# Patient Record
Sex: FEMALE | Race: WHITE | URBAN - NONMETROPOLITAN AREA
[De-identification: names, ages, dates, MRNs, and addresses within clinical notes are randomized per-mention and may not be internally consistent; named-entity substitution may affect disease eponyms.]

---

## 2017-07-10 ENCOUNTER — HOSPITAL ENCOUNTER (EMERGENCY)
Facility: HOSPITAL | Age: 27
Discharge: HOME | End: 2017-07-11
Attending: EMERGENCY MEDICINE | Admitting: EMERGENCY MEDICINE
Payer: COMMERCIAL

## 2017-07-10 DIAGNOSIS — L55.0 SUNBURN OF FIRST DEGREE: ICD-10-CM

## 2017-07-10 PROCEDURE — 25000128 H RX IP 250 OP 636: Performed by: EMERGENCY MEDICINE

## 2017-07-10 PROCEDURE — 25000125 ZZHC RX 250: Performed by: EMERGENCY MEDICINE

## 2017-07-10 PROCEDURE — 96372 THER/PROPH/DIAG INJ SC/IM: CPT

## 2017-07-10 PROCEDURE — 25000132 ZZH RX MED GY IP 250 OP 250 PS 637: Performed by: EMERGENCY MEDICINE

## 2017-07-10 PROCEDURE — 99284 EMERGENCY DEPT VISIT MOD MDM: CPT | Mod: 25

## 2017-07-10 PROCEDURE — 99284 EMERGENCY DEPT VISIT MOD MDM: CPT | Performed by: EMERGENCY MEDICINE

## 2017-07-10 RX ORDER — LORAZEPAM 1 MG/1
1 TABLET ORAL ONCE
Status: COMPLETED | OUTPATIENT
Start: 2017-07-10 | End: 2017-07-10

## 2017-07-10 RX ORDER — ONDANSETRON 4 MG/1
4 TABLET, ORALLY DISINTEGRATING ORAL ONCE
Status: COMPLETED | OUTPATIENT
Start: 2017-07-10 | End: 2017-07-10

## 2017-07-10 RX ORDER — KETOROLAC TROMETHAMINE 15 MG/ML
15 INJECTION, SOLUTION INTRAMUSCULAR; INTRAVENOUS ONCE
Status: COMPLETED | OUTPATIENT
Start: 2017-07-10 | End: 2017-07-10

## 2017-07-10 RX ADMIN — KETOROLAC TROMETHAMINE 15 MG: 15 INJECTION, SOLUTION INTRAMUSCULAR; INTRAVENOUS at 23:55

## 2017-07-10 RX ADMIN — LORAZEPAM 1 MG: 1 TABLET ORAL at 23:55

## 2017-07-10 RX ADMIN — ONDANSETRON 4 MG: 4 TABLET, ORALLY DISINTEGRATING ORAL at 23:55

## 2017-07-10 NOTE — ED AVS SNAPSHOT
[facility]    [address]    [place] [address]    Phone:  [phone]                                       Nicky Simpson   MRN: [id number]    Department:  [facility]   Date of Visit:  7/10/2017           Patient Information     Date Of Birth          1990        Your diagnoses for this visit were:     Sunburn of first degree        You were seen by Cheikh Dugan MD.      Follow-up Information     Follow up with [facility].    Specialty:  EMERGENCY MEDICINE    Why:  As needed if signs of infection occur    Contact information:    [address]  [place] [address] [phone]    Additional information:    From [place]: Take [address]. Turn left at [address]. Turn left at the first stoplight on [address] [place] [address]. At the first stop sign, take a right onto [address]. Take a left into the parking lot and continue through until you reach the North enterance of the building.       From [place]: Take [address]. Take the [address] ramp towards [place]. Turn left onto [address]. Take a slight right onto [address]. Turn left at the first stoplight on [address]. At the first stop sign, take a right onto [address]. Take a left into the parking lot and continue through until you reach the North enterance of the building.       From [place]: Take [address]. Take a right at [address]. At the first stop sign, take a right onto [address]. Take a left into the parking lot and continue through until you reach the North enterance of the building.         Discharge Instructions         Sunburn  A sunburn is an injury to the skin. It is caused by over-exposure to ultraviolet (UV) light from the sun. The skin becomes pink or red and painful. Very severe sunburns may cause blistering and fluid draining from the skin. Open blisters may become infected. Watch for signs  of infection. These include worsening pain, redness, swelling, or pus draining from the burn.  Sunburn starts to get better after 1 to 2 days. A few days later the skin begins to peel. It may take up to 3 weeks to fully heal. This depends on how severe the burn is.  Home care  The following guidelines will help you care for your sunburn at home:    Place an ice pack over the injured area. Do this for 20 minutes every 1 to 2 hours the first day for pain relief. To make an ice pack, put ice cubes in a plastic bag that seals at the top. Wrap the bag in a clean, thin towel or cloth. Never put ice or an ice pack directly on your skin. This can cause damage. You can keep using an ice pack at least 3 to 4 times a day until the pain goes away. Cool baths and showers will also help with pain relief.    If you have blisters, don't break them. Open blisters slow the healing process. They also raise your risk of infection. You can cover the open blisters with antibacterial cream or ointment, and a dressing or bandage.    Wash the burned area daily with soap and water. Then gently dry it with a clean towel. If a dressing was used, put a clean one back on until any open blisters dry up. If the bandage sticks, soak it off in warm water. You can also use nonstick dressings to help prevent this from happening.    Drink plenty of fluids to avoid fluid loss (dehydration).  Medicine    You can take over-the-counter medicine for pain, unless you were given a different pain medicine to use. Talk with your provider before using these medicines if you have chronic liver or kidney disease, ever had a stomach ulcer or GI bleeding, or are taking blood thinner medicines. Don't give ibuprofen to children younger than 6 months.    Over-the-counter first-aid creams and sprays made with lidocaine or benzocaine can also help with pain. However, some people are sensitive to medicines that have these ingredients. If the redness or itching gets worse,  stop using these medicines. You can use aloe or a moisturizing cream with aloe, or hydrocortisone cream (sold over the counter) to help with pain and swelling. Use these only over spots that don t have broken blisters.    Antibiotics are usually not given unless there is an infection. If you were prescribed antibiotics, take them until they are finished. It is important to finish the antibiotics even if the burn looks better. This will ensure the infection has cleared.  Prevention  Sun exposure damages the DNA of skin cells. This can lead to premature skin aging and wrinkles. Sun exposure is the main cause of skin cancer. Protect your skin from the sun by following these tips:    Limit your exposure to UV light. The sun is strongest from 10 a.m. to 4 p.m. If possible, arrange your sun exposure to be before or after those hours. The sun is more intense at the beach, where light reflects off the sand and water. The sun is also more intense at higher altitudes, especially where there is reflecting snow. You can even get sunburn on a cloudy day, because UV light passes through clouds.    Do not use tanning beds.    Wear protective clothing and a hat. Clothing is more effective than sunscreen alone in blocking UV light.    Stay in the shade or carry an umbrella.    Use sunscreen on your skin. Put sunscreen on 15 to 20 minutes before going out in the sun. This gives the sunscreen time to interact with your skin. Reapply sunscreen every 1 to 2 hours. Reapply it sooner if it is washed away by sweat or water. Use a sunscreen rated at SPF 30 or higher.    Wear sunglasses to protect your eyes from UV exposure.    Many medicines can increase your sensitivity to the sun. These include heart, nausea, anti-inflammatory, and diabetic medicines. It also includes antibiotics and diuretics. Check medicine fact sheets. Talk with your provider if you have questions about your increased risk of sun sensitivity. Sunscreens may not prevent  this.   Follow-up care  Sunburn usually heals without any problems. Follow up with your provider if your sunburn is not healing with home treatments. Also see your provider if you have signs and symptoms of infection.  When to seek medical advice  Call your healthcare provider right away if any of these occur:    Pain that gets worse    Increasing redness, or red streaks leading away from an open blister    Swelling or pus coming from open blisters    Upset stomach (nausea)    Fever of 100.4  F (38.0  C) or higher, or as directed  Call 911  Call 911 if you have dizziness, fainting, or weakness.  Date Last Reviewed: 8/1/2016 2000-2017 [employer]. [address] [place] [address], [place] [address]. All rights reserved. This information is not intended as a substitute for professional medical care. Always follow your healthcare professional's instructions.    =============================================    One percent hydrocortisone cream (eg Cortate-10) can be applied up to 3 times daily to the effected areas, available without prescription  Lanacane is another topical cream that can help with the pain.  Acetaminophen up to 1000 milligrams up to 4 times daily as needed  Ibuprofen 400-600 mg up to 4 times daily as needed         Review of your medicines      Our records show that you are taking the medicines listed below. If these are incorrect, please call your family doctor or clinic.        Dose / Directions Last dose taken    ADDERALL PO   Dose:  15 mg        Take 15 mg by mouth daily   Refills:  0        LEXAPRO PO   Dose:  20 mg        Take 20 mg by mouth daily   Refills:  0                Orders Needing Specimen Collection     None      Pending Results     No orders found from 7/8/2017 to 7/11/2017.            Pending Culture Results     No orders found from 7/8/2017 to 7/11/2017.            Thank you for choosing [facility]       Thank you for choosing [facility] for your care. Our goal is always to  "provide you with excellent care. Hearing back from our patients is one way we can continue to improve our services. Please take a few minutes to complete the written survey that you may receive in the mail after you visit with us. Thank you!        [employer] Information     [employer] lets you send messages to your doctor, view your test results, renew your prescriptions, schedule appointments and more. To sign up, go to [email or web address] . Click on \"Log in\" on the left side of the screen, which will take you to the Welcome page. Then click on \"Sign up Now\" on the right side of the page.     You will be asked to enter the access code listed below, as well as some personal information. Please follow the directions to create your username and password.     Your access code is: [email or web address]  Expires: 10/8/2017 11:48 PM     Your access code will  in 90 days. If you need help or a new code, please call your [facility] clinic or [phone].        Care EveryWhere ID     This is your Care EveryWhere ID. This could be used by other organizations to access your [facility] medical records  [id number]        Equal Access to Services     TABBY SUAZO : Hadii tk Byrd, wasamanthada rhina, qaestefania kaalmada cynthia, sofiya leggett. So [facility] [phone].    ATENCIÓN: Si habla español, tiene a gallardo disposición servicios gratuitos de asistencia lingüística. Chuck al [phone].    We comply with applicable federal civil rights laws and [place] laws. We do not discriminate on the basis of race, color, national origin, age, disability sex, sexual orientation or gender identity.            After Visit Summary       This is your record. Keep this with you and show to your community pharmacist(s) and doctor(s) at your next visit.                  "

## 2017-07-10 NOTE — ED AVS SNAPSHOT
[facility]    [address]    [place] [address]    Phone:  [phone]                                       Nicky Simpson   MRN: [id number]    Department[facility]  [facility]   Date of Visit:  7/10/2017           After Visit Summary Signature Page     I have received my discharge instructions, and my questions have been answered. I have discussed any challenges I see with this plan with the nurse or doctor.    ..........................................................................................................................................  Patient/Patient Representative Signature      ..........................................................................................................................................  Patient Representative Print Name and Relationship to Patient    ..................................................               ................................................  Date                                            Time    ..........................................................................................................................................  Reviewed by Signature/Title    ...................................................              ..............................................  Date                                                            Time

## 2017-07-11 VITALS
SYSTOLIC BLOOD PRESSURE: 105 MMHG | OXYGEN SATURATION: 99 % | TEMPERATURE: 98.9 F | DIASTOLIC BLOOD PRESSURE: 72 MMHG | HEART RATE: 95 BPM | RESPIRATION RATE: 16 BRPM

## 2017-07-11 ASSESSMENT — ENCOUNTER SYMPTOMS
VOMITING: 1
NERVOUS/ANXIOUS: 1
NAUSEA: 1

## 2017-07-11 NOTE — ED PROVIDER NOTES
History     Chief Complaint   Patient presents with     Sunburn     pt was sitting in the sun too long today in a bikinia and has redness throughout body.      Nausea & Vomiting     pt reports every time she drinks water today she vomits it up     HPI Comments: 23.30  Brought by her mother-in-law    Chief complaint  Sunburn    History of present illness  26-year-old female with history of ADHD/anxiety  Also history of tachycardia, she states she usually has a heartbeat of 100-110  Was out in the sun for several hours and her bathing suit  Sustaining burns to most of her body.  Chest took Tylenol about 8 hours ago for headache prior to the burn onset.  She has put baby oil and elevator on her burn but is still quite painful.    Had a few episodes of vomiting but has been able to drink water    The history is provided by the patient.     History reviewed. No pertinent past medical history.  History reviewed. No pertinent surgical history.   No Known Allergies  No current facility-administered medications for this encounter.      Current Outpatient Prescriptions   Medication     Escitalopram Oxalate (LEXAPRO PO)     Amphetamine-Dextroamphetamine (ADDERALL PO)         Review of Systems   Gastrointestinal: Positive for nausea and vomiting.   Skin:        Extensive sunburn   Psychiatric/Behavioral: The patient is nervous/anxious.    All other systems reviewed and are negative.      Physical Exam   BP: 122/87  Pulse: (!) 146  Heart Rate: (!) 150  Temp: 97.7  F (36.5  C)  Resp: 18  SpO2: 100 %  Physical Exam   Constitutional: She appears well-developed and well-nourished. She appears distressed.   Extremely anxious and uncomfortable  Tachycardic rate 150, mild elevation of blood pressure  No difficulty speaking or breathing   HENT:   Mouth/Throat: Oropharynx is clear and moist.   Eyes: Pupils are equal, round, and reactive to light.   Cardiovascular: Regular rhythm and intact distal pulses.    tachycardia    Pulmonary/Chest: Effort normal and breath sounds normal. No respiratory distress.   Neurological: She is alert.   Skin: Skin is warm and dry. She is not diaphoretic. There is erythema.   Extensive sunburnboth sides of her body  One tiny blister, on her leg, the remaining sunburn  Is all first degree  approx 90% BSA   Psychiatric: She has a normal mood and affect. Her speech is rapid and/or pressured. She is agitated.   Nursing note and vitals reviewed.      ED Course     ED Course     Procedures        Toradol 15mg IM  Ondansetron 4 mg ODT by mouth  Lorazepam 1 mg sublingual by mouth         Labs Ordered and Resulted from Time of ED Arrival Up to the Time of Departure from the ED - No data to display    Assessments & Plan (with Medical Decision Making)     I have reviewed the nursing notes.    I have reviewed the findings, diagnosis, plan and need for follow up with the patient.  26-year-old female with extensive sunburn to her body almost all first degree sunburn    Discharge Medication List as of 7/10/2017 11:48 PM          Final diagnoses:   Sunburn of first degree       Sunburn  A sunburn is an injury to the skin. It is caused by over-exposure to ultraviolet (UV) light from the sun. The skin becomes pink or red and painful. Very severe sunburns may cause blistering and fluid draining from the skin. Open blisters may become infected. Watch for signs of infection. These include worsening pain, redness, swelling, or pus draining from the burn.  Sunburn starts to get better after 1 to 2 days. A few days later the skin begins to peel. It may take up to 3 weeks to fully heal. This depends on how severe the burn is.  Home care  The following guidelines will help you care for your sunburn at home:    Place an ice pack over the injured area. Do this for 20 minutes every 1 to 2 hours the first day for pain relief. To make an ice pack, put ice cubes in a plastic bag that seals at the top. Wrap the bag in a clean,  thin towel or cloth. Never put ice or an ice pack directly on your skin. This can cause damage. You can keep using an ice pack at least 3 to 4 times a day until the pain goes away. Cool baths and showers will also help with pain relief.    If you have blisters, don't break them. Open blisters slow the healing process. They also raise your risk of infection. You can cover the open blisters with antibacterial cream or ointment, and a dressing or bandage.    Wash the burned area daily with soap and water. Then gently dry it with a clean towel. If a dressing was used, put a clean one back on until any open blisters dry up. If the bandage sticks, soak it off in warm water. You can also use nonstick dressings to help prevent this from happening.    Drink plenty of fluids to avoid fluid loss (dehydration).  Medicine    You can take over-the-counter medicine for pain, unless you were given a different pain medicine to use. Talk with your provider before using these medicines if you have chronic liver or kidney disease, ever had a stomach ulcer or GI bleeding, or are taking blood thinner medicines. Don't give ibuprofen to children younger than 6 months.    Over-the-counter first-aid creams and sprays made with lidocaine or benzocaine can also help with pain. However, some people are sensitive to medicines that have these ingredients. If the redness or itching gets worse, stop using these medicines. You can use aloe or a moisturizing cream with aloe, or hydrocortisone cream (sold over the counter) to help with pain and swelling. Use these only over spots that don t have broken blisters.    Antibiotics are usually not given unless there is an infection. If you were prescribed antibiotics, take them until they are finished. It is important to finish the antibiotics even if the burn looks better. This will ensure the infection has cleared.  Prevention  Sun exposure damages the DNA of skin cells. This can lead to premature skin  aging and wrinkles. Sun exposure is the main cause of skin cancer. Protect your skin from the sun by following these tips:    Limit your exposure to UV light. The sun is strongest from 10 a.m. to 4 p.m. If possible, arrange your sun exposure to be before or after those hours. The sun is more intense at the beach, where light reflects off the sand and water. The sun is also more intense at higher altitudes, especially where there is reflecting snow. You can even get sunburn on a cloudy day, because UV light passes through clouds.    Do not use tanning beds.    Wear protective clothing and a hat. Clothing is more effective than sunscreen alone in blocking UV light.    Stay in the shade or carry an umbrella.    Use sunscreen on your skin. Put sunscreen on 15 to 20 minutes before going out in the sun. This gives the sunscreen time to interact with your skin. Reapply sunscreen every 1 to 2 hours. Reapply it sooner if it is washed away by sweat or water. Use a sunscreen rated at SPF 30 or higher.    Wear sunglasses to protect your eyes from UV exposure.    Many medicines can increase your sensitivity to the sun. These include heart, nausea, anti-inflammatory, and diabetic medicines. It also includes antibiotics and diuretics. Check medicine fact sheets. Talk with your provider if you have questions about your increased risk of sun sensitivity. Sunscreens may not prevent this.   Follow-up care  Sunburn usually heals without any problems. Follow up with your provider if your sunburn is not healing with home treatments. Also see your provider if you have signs and symptoms of infection.  When to seek medical advice  Call your healthcare provider right away if any of these occur:    Pain that gets worse    Increasing redness, or red streaks leading away from an open blister    Swelling or pus coming from open blisters    Upset stomach (nausea)    Fever of 100.4  F (38.0  C) or higher, or as directed  Call 911  Call 911 if you  have dizziness, fainting, or weakness.  Date Last Reviewed: 8/1/2016 2000-2017 [employer]. [address] [place] [address], [place] [address]. All rights reserved. This information is not intended as a substitute for professional medical care. Always follow your healthcare professional's instructions.    =============================================    One percent hydrocortisone cream (eg Cortate-10) can be applied up to 3 times daily to the effected areas, available without prescription  Lanacane is another topical cream that can help with the pain.  Acetaminophen up to 1000 milligrams up to 4 times daily as needed  Ibuprofen 400-600 mg up to 4 times daily as needed      7/10/2017   [facility]     Cheikh Dugan MD  07/11/17 0630       Cheikh Dugan MD  07/11/17 0601

## 2017-07-11 NOTE — DISCHARGE INSTRUCTIONS
Sunburn  A sunburn is an injury to the skin. It is caused by over-exposure to ultraviolet (UV) light from the sun. The skin becomes pink or red and painful. Very severe sunburns may cause blistering and fluid draining from the skin. Open blisters may become infected. Watch for signs of infection. These include worsening pain, redness, swelling, or pus draining from the burn.  Sunburn starts to get better after 1 to 2 days. A few days later the skin begins to peel. It may take up to 3 weeks to fully heal. This depends on how severe the burn is.  Home care  The following guidelines will help you care for your sunburn at home:    Place an ice pack over the injured area. Do this for 20 minutes every 1 to 2 hours the first day for pain relief. To make an ice pack, put ice cubes in a plastic bag that seals at the top. Wrap the bag in a clean, thin towel or cloth. Never put ice or an ice pack directly on your skin. This can cause damage. You can keep using an ice pack at least 3 to 4 times a day until the pain goes away. Cool baths and showers will also help with pain relief.    If you have blisters, don't break them. Open blisters slow the healing process. They also raise your risk of infection. You can cover the open blisters with antibacterial cream or ointment, and a dressing or bandage.    Wash the burned area daily with soap and water. Then gently dry it with a clean towel. If a dressing was used, put a clean one back on until any open blisters dry up. If the bandage sticks, soak it off in warm water. You can also use nonstick dressings to help prevent this from happening.    Drink plenty of fluids to avoid fluid loss (dehydration).  Medicine    You can take over-the-counter medicine for pain, unless you were given a different pain medicine to use. Talk with your provider before using these medicines if you have chronic liver or kidney disease, ever had a stomach ulcer or GI bleeding, or are taking blood thinner  medicines. Don't give ibuprofen to children younger than 6 months.    Over-the-counter first-aid creams and sprays made with lidocaine or benzocaine can also help with pain. However, some people are sensitive to medicines that have these ingredients. If the redness or itching gets worse, stop using these medicines. You can use aloe or a moisturizing cream with aloe, or hydrocortisone cream (sold over the counter) to help with pain and swelling. Use these only over spots that don t have broken blisters.    Antibiotics are usually not given unless there is an infection. If you were prescribed antibiotics, take them until they are finished. It is important to finish the antibiotics even if the burn looks better. This will ensure the infection has cleared.  Prevention  Sun exposure damages the DNA of skin cells. This can lead to premature skin aging and wrinkles. Sun exposure is the main cause of skin cancer. Protect your skin from the sun by following these tips:    Limit your exposure to UV light. The sun is strongest from 10 a.m. to 4 p.m. If possible, arrange your sun exposure to be before or after those hours. The sun is more intense at the beach, where light reflects off the sand and water. The sun is also more intense at higher altitudes, especially where there is reflecting snow. You can even get sunburn on a cloudy day, because UV light passes through clouds.    Do not use tanning beds.    Wear protective clothing and a hat. Clothing is more effective than sunscreen alone in blocking UV light.    Stay in the shade or carry an umbrella.    Use sunscreen on your skin. Put sunscreen on 15 to 20 minutes before going out in the sun. This gives the sunscreen time to interact with your skin. Reapply sunscreen every 1 to 2 hours. Reapply it sooner if it is washed away by sweat or water. Use a sunscreen rated at SPF 30 or higher.    Wear sunglasses to protect your eyes from UV exposure.    Many medicines can increase  your sensitivity to the sun. These include heart, nausea, anti-inflammatory, and diabetic medicines. It also includes antibiotics and diuretics. Check medicine fact sheets. Talk with your provider if you have questions about your increased risk of sun sensitivity. Sunscreens may not prevent this.   Follow-up care  Sunburn usually heals without any problems. Follow up with your provider if your sunburn is not healing with home treatments. Also see your provider if you have signs and symptoms of infection.  When to seek medical advice  Call your healthcare provider right away if any of these occur:    Pain that gets worse    Increasing redness, or red streaks leading away from an open blister    Swelling or pus coming from open blisters    Upset stomach (nausea)    Fever of 100.4  F (38.0  C) or higher, or as directed  Call 911  Call 911 if you have dizziness, fainting, or weakness.  Date Last Reviewed: 8/1/2016 2000-2017 [employer]. [address] [place] [address]. All rights reserved. This information is not intended as a substitute for professional medical care. Always follow your healthcare professional's instructions.    =============================================    One percent hydrocortisone cream (eg Cortate-10) can be applied up to 3 times daily to the effected areas, available without prescription  Lanacane is another topical cream that can help with the pain.  Acetaminophen up to 1000 milligrams up to 4 times daily as needed  Ibuprofen 400-600 mg up to 4 times daily as needed

## 2017-07-11 NOTE — ED NOTES
Pt verbalized understanding of all discharge instructions.  Pt given IM tordol, PO zofran and ativan.